# Patient Record
Sex: MALE
[De-identification: names, ages, dates, MRNs, and addresses within clinical notes are randomized per-mention and may not be internally consistent; named-entity substitution may affect disease eponyms.]

---

## 2024-09-10 PROBLEM — Z00.00 ENCOUNTER FOR PREVENTIVE HEALTH EXAMINATION: Status: ACTIVE | Noted: 2024-09-10

## 2024-09-11 ENCOUNTER — APPOINTMENT (OUTPATIENT)
Dept: NEUROLOGY | Facility: CLINIC | Age: 69
End: 2024-09-11
Payer: MEDICARE

## 2024-09-11 VITALS
BODY MASS INDEX: 19.18 KG/M2 | DIASTOLIC BLOOD PRESSURE: 84 MMHG | OXYGEN SATURATION: 97 % | WEIGHT: 137 LBS | HEART RATE: 85 BPM | HEIGHT: 71 IN | SYSTOLIC BLOOD PRESSURE: 149 MMHG

## 2024-09-11 DIAGNOSIS — F02.80 OTHER FRONTOTEMPORAL DEMENTIA: ICD-10-CM

## 2024-09-11 DIAGNOSIS — E78.2 MIXED HYPERLIPIDEMIA: ICD-10-CM

## 2024-09-11 DIAGNOSIS — R41.3 OTHER AMNESIA: ICD-10-CM

## 2024-09-11 DIAGNOSIS — F07.0 PERSONALITY CHANGE DUE TO KNOWN PHYSIOLOGICAL CONDITION: ICD-10-CM

## 2024-09-11 DIAGNOSIS — C43.9 MALIGNANT MELANOMA OF SKIN, UNSPECIFIED: ICD-10-CM

## 2024-09-11 DIAGNOSIS — C61 MALIGNANT NEOPLASM OF PROSTATE: ICD-10-CM

## 2024-09-11 DIAGNOSIS — Z86.79 PERSONAL HISTORY OF OTHER DISEASES OF THE CIRCULATORY SYSTEM: ICD-10-CM

## 2024-09-11 DIAGNOSIS — K21.9 GASTRO-ESOPHAGEAL REFLUX DISEASE W/OUT ESOPHAGITIS: ICD-10-CM

## 2024-09-11 DIAGNOSIS — F43.29 ADJUSTMENT DISORDER WITH OTHER SYMPTOMS: ICD-10-CM

## 2024-09-11 DIAGNOSIS — G31.09 OTHER FRONTOTEMPORAL DEMENTIA: ICD-10-CM

## 2024-09-11 DIAGNOSIS — F32.A DEPRESSION, UNSPECIFIED: ICD-10-CM

## 2024-09-11 DIAGNOSIS — I63.9 CEREBRAL INFARCTION, UNSPECIFIED: ICD-10-CM

## 2024-09-11 DIAGNOSIS — N40.0 BENIGN PROSTATIC HYPERPLASIA WITHOUT LOWER URINARY TRACT SYMPMS: ICD-10-CM

## 2024-09-11 PROCEDURE — 99205 OFFICE O/P NEW HI 60 MIN: CPT

## 2024-09-11 RX ORDER — ATORVASTATIN CALCIUM 40 MG/1
40 TABLET, FILM COATED ORAL
Refills: 0 | Status: ACTIVE | COMMUNITY

## 2024-09-11 RX ORDER — TAMSULOSIN HYDROCHLORIDE 0.4 MG/1
0.4 CAPSULE ORAL
Refills: 0 | Status: ACTIVE | COMMUNITY

## 2024-09-11 RX ORDER — DEUCRAVACITINIB 6 MG/1
6 TABLET, FILM COATED ORAL
Refills: 0 | Status: ACTIVE | COMMUNITY

## 2024-09-11 RX ORDER — ESCITALOPRAM OXALATE 20 MG/1
20 TABLET ORAL
Refills: 0 | Status: ACTIVE | COMMUNITY

## 2024-09-11 RX ORDER — DONEPEZIL HYDROCHLORIDE 5 MG/1
5 TABLET ORAL
Qty: 60 | Refills: 5 | Status: ACTIVE | COMMUNITY
Start: 2024-09-11 | End: 1900-01-01

## 2024-09-11 RX ORDER — ASPIRIN 81 MG
81 TABLET, DELAYED RELEASE (ENTERIC COATED) ORAL
Refills: 0 | Status: ACTIVE | COMMUNITY

## 2024-09-11 RX ORDER — MIRABEGRON 8 MG/8ML
GRANULE, FOR SUSPENSION, EXTENDED RELEASE ORAL
Refills: 0 | Status: ACTIVE | COMMUNITY

## 2024-09-11 NOTE — ASSESSMENT
[FreeTextEntry1] : Bar Day is a 70YO male, presenting to the office today to establish care for memory loss related to history of stroke.  Patient's main complaint is short-term memory loss with symptoms of dementia including getting lost wandering, forgetting to eat or bathe and social isolation.  Patient does also exhibit afternoon fatigue which is common after stroke.  Discussed all of these findings with the patient and his family who are present today and discussed that given the location of the strokes within the brain that this is very typical findings.  We discussed that given that they were triggered by stroke there is no way to say if these will progress or remain the same however given the atrophy of the brain is more advanced than expected for his age would suggest that is safe to assume that these memory issues will continue to progress further.  Discussed with the family that memory issues are often progressive and that there is no current cure or significant treatment available for dementia.  Reassured the family that they are doing the right thing by having him in assisted living where he can have daytime interaction and socialization as well as be monitored to take his medications regularly and on time as well as eat and shower regularly.  Will start donepezil 5 mg once daily to help slow any progression of memory loss.  Side effects discussed with the patient and family and should the patient tolerate the medication recommend increasing to 2 tablets or 10 mg at bedtime in 2 weeks.  Patient encouraged to keep social, continue to do brain tweezers and puzzles, crossword's, reading and to maintain his hobbies as this will help maintain cognitive function.  Also recommending that the patient start PT as planned for gait and balance as patient appears to have some muscle atrophy likely contributing to unsteady gait.  Discussed that patient's history of prostate cancer could be a contributing factor to his hypercoagulable state and recommend following up with urologist as planned to monitor this.  -Follow-up in 6 months or sooner should the need arise.

## 2024-09-11 NOTE — HISTORY OF PRESENT ILLNESS
[FreeTextEntry1] : Bar Day is a 68YO male, presenting to the office today to establish care. Patient was diagnosed with an old stroke last year, now suffers from short term memory loss.  Patient presents with his cousin and healthcare proxy Petra today and her .  Petra and her  report that the patient was living in [facility] with his wife up until 2020 when he suddenly lost his wife during the pandemic.  Patient was then living alone and began to display concerning behavior such as getting lost while driving, wondering or exhibiting forgetful findings.  Healthcare proxy and family decided it was no longer safe for the patient to be living alone and far away from other family members.  Patient was moved down to [place], [place] and is now established living in [place] assisted [facility] for the last few months.  Before the patient had moved to [place] family had taken him to see a neurologist [facility] in June who diagnosed but the patient had a chronic infarct on MRI significant for past stroke.  It is unclear when the patient might of suffered the stroke.  Given the location of stroke in the right frontal lobe and right basal ganglia, memory changes are likely stroke related.  Most recent MRI of the brain also showed advanced atrophy of the brain which could also suggest memory issues from that alone. Patient is currently on ASA 81mg daily, and atorvastatin 40mg daily for stroke prevention.  Patient reports good blood pressure control.  Patient had cardiac workup at time that stroke was found on MRI.  His cousin reports echo and Holter monitor were negative for A-fib or PFO.  Patient strokes are from an unknown cause at this point, patient reports he was a former smoker smoking for about 4 years while in college but then quitting since.  Family also reports that the patient has lost about 15 pounds in the last year before coming to the assisted living, he was forgetting to eat.  Other clinical findings that may be related to stroke include bladder incontinence and urgency.  Patient is currently wearing adult diapers, with known history of prostate CA not currently being treated, currently being monitored.  Patient has follow-up appointment with a new urologist tomorrow.

## 2024-09-11 NOTE — PHYSICAL EXAM
[FreeTextEntry1] : GENERAL PHYSICAL EXAM: GEN: no distress, normal affect EYES: sclera white, conjunctiva clear, no nystagmus SKIN: warm, dry, no rash or lesion on exposed skin   NEUROLOGICAL EXAM: Mental Status Orientation: alert and oriented to person, time, and situation, disoriented to place Language: clear and fluent, intact comprehension and repetition   Cranial Nerves II: visual fields full to confrontation III, IV, VI: PERRL, EOMI V, VII: facial sensation and movement intact and symmetric VIII: hearing intact IX, X: uvula midline, soft palate elevates normally XI: BL shoulder shrug intact XII: tongue midline   Motor Bilateral muscle strength 5/5 in UE and LE, proximally and distally Tone and bulk are normal in upper and lower limbs   Sensation Intact to light touch in all 4 EXTs   Reflex 2+ in BL biceps, brachioradialis, patella   Coordination Normal FTN bilaterally   Gait Normal stance, stride, and pivot turn Gait mildly unsteady Negative Romberg.

## 2024-09-11 NOTE — DATA REVIEWED
[de-identified] : 6/7/24: Impression shows old right basal ganglia and right frontal white matter infarct. No acute intracranial abnormality. Chronic small vessel ischemic disease is likely Advanced cerebral volume loss. [de-identified] : CT Head, CTA Head and Neck 7/3/23: Chronic infarct at the right frontal lobe white matter and smaller chronic lacunar infarct of the left frontal lobe white matter.  Mild involutional change and stigmata of chronic small vessel ischemic disease. Right ICA negative for stenosis left ICA negative for stenosis. Normal variants shown on CTA of head.  Negative for intracranial large vessel occlusion or hemodynamic significant stenosis.

## 2024-09-15 ENCOUNTER — NON-APPOINTMENT (OUTPATIENT)
Age: 69
End: 2024-09-15

## 2024-10-18 ENCOUNTER — NON-APPOINTMENT (OUTPATIENT)
Age: 69
End: 2024-10-18

## 2025-01-09 ENCOUNTER — APPOINTMENT (OUTPATIENT)
Dept: NEUROLOGY | Facility: CLINIC | Age: 70
End: 2025-01-09

## 2025-01-09 VITALS
DIASTOLIC BLOOD PRESSURE: 78 MMHG | HEART RATE: 71 BPM | HEIGHT: 71 IN | BODY MASS INDEX: 21.42 KG/M2 | OXYGEN SATURATION: 97 % | TEMPERATURE: 97.5 F | WEIGHT: 153 LBS | SYSTOLIC BLOOD PRESSURE: 160 MMHG

## 2025-01-09 DIAGNOSIS — G31.84 MILD COGNITIVE IMPAIRMENT, SO STATED: ICD-10-CM

## 2025-01-09 PROCEDURE — 99204 OFFICE O/P NEW MOD 45 MIN: CPT

## 2025-01-10 ENCOUNTER — TRANSCRIPTION ENCOUNTER (OUTPATIENT)
Age: 70
End: 2025-01-10

## 2025-01-29 ENCOUNTER — APPOINTMENT (OUTPATIENT)
Dept: NUCLEAR MEDICINE | Facility: CLINIC | Age: 70
End: 2025-01-29

## 2025-01-29 ENCOUNTER — OUTPATIENT (OUTPATIENT)
Dept: OUTPATIENT SERVICES | Facility: HOSPITAL | Age: 70
LOS: 1 days | End: 2025-01-29

## 2025-01-29 DIAGNOSIS — G31.84 MILD COGNITIVE IMPAIRMENT OF UNCERTAIN OR UNKNOWN ETIOLOGY: ICD-10-CM

## 2025-01-29 PROCEDURE — 78608 BRAIN IMAGING (PET): CPT | Mod: 26

## 2025-02-12 ENCOUNTER — APPOINTMENT (OUTPATIENT)
Dept: NEUROLOGY | Facility: CLINIC | Age: 70
End: 2025-02-12
Payer: MEDICARE

## 2025-02-12 VITALS
HEART RATE: 60 BPM | BODY MASS INDEX: 21 KG/M2 | WEIGHT: 150 LBS | OXYGEN SATURATION: 98 % | DIASTOLIC BLOOD PRESSURE: 81 MMHG | SYSTOLIC BLOOD PRESSURE: 164 MMHG | HEIGHT: 71 IN

## 2025-02-12 DIAGNOSIS — F02.80 OTHER FRONTOTEMPORAL DEMENTIA: ICD-10-CM

## 2025-02-12 DIAGNOSIS — F01.50 VASCULAR DEMENTIA W/OUT BEHAVIORAL DISTURBANCE: ICD-10-CM

## 2025-02-12 DIAGNOSIS — I10 ESSENTIAL (PRIMARY) HYPERTENSION: ICD-10-CM

## 2025-02-12 DIAGNOSIS — G31.09 OTHER FRONTOTEMPORAL DEMENTIA: ICD-10-CM

## 2025-02-12 DIAGNOSIS — G31.84 MILD COGNITIVE IMPAIRMENT, SO STATED: ICD-10-CM

## 2025-02-12 PROCEDURE — 99215 OFFICE O/P EST HI 40 MIN: CPT

## 2025-02-12 RX ORDER — AMLODIPINE BESYLATE 5 MG/1
5 TABLET ORAL DAILY
Qty: 30 | Refills: 3 | Status: ACTIVE | COMMUNITY
Start: 2025-02-12 | End: 1900-01-01

## 2025-02-23 ENCOUNTER — NON-APPOINTMENT (OUTPATIENT)
Age: 70
End: 2025-02-23

## 2025-03-11 ENCOUNTER — NON-APPOINTMENT (OUTPATIENT)
Age: 70
End: 2025-03-11

## 2025-03-12 ENCOUNTER — APPOINTMENT (OUTPATIENT)
Dept: NEUROLOGY | Facility: CLINIC | Age: 70
End: 2025-03-12

## 2025-05-10 ENCOUNTER — NON-APPOINTMENT (OUTPATIENT)
Age: 70
End: 2025-05-10

## 2025-05-12 ENCOUNTER — APPOINTMENT (OUTPATIENT)
Dept: NEUROLOGY | Facility: CLINIC | Age: 70
End: 2025-05-12
Payer: MEDICARE

## 2025-05-12 VITALS
DIASTOLIC BLOOD PRESSURE: 73 MMHG | WEIGHT: 138 LBS | OXYGEN SATURATION: 97 % | HEART RATE: 68 BPM | BODY MASS INDEX: 19.32 KG/M2 | SYSTOLIC BLOOD PRESSURE: 125 MMHG | HEIGHT: 71 IN

## 2025-05-12 DIAGNOSIS — F32.A DEPRESSION, UNSPECIFIED: ICD-10-CM

## 2025-05-12 DIAGNOSIS — F02.80 OTHER FRONTOTEMPORAL DEMENTIA: ICD-10-CM

## 2025-05-12 DIAGNOSIS — I63.9 CEREBRAL INFARCTION, UNSPECIFIED: ICD-10-CM

## 2025-05-12 DIAGNOSIS — I10 ESSENTIAL (PRIMARY) HYPERTENSION: ICD-10-CM

## 2025-05-12 DIAGNOSIS — G31.09 OTHER FRONTOTEMPORAL DEMENTIA: ICD-10-CM

## 2025-05-12 DIAGNOSIS — F01.50 VASCULAR DEMENTIA W/OUT BEHAVIORAL DISTURBANCE: ICD-10-CM

## 2025-05-12 PROCEDURE — 99215 OFFICE O/P EST HI 40 MIN: CPT

## 2025-05-12 PROCEDURE — G2211 COMPLEX E/M VISIT ADD ON: CPT

## 2025-06-11 ENCOUNTER — APPOINTMENT (OUTPATIENT)
Dept: NEUROLOGY | Facility: CLINIC | Age: 70
End: 2025-06-11

## 2025-09-08 ENCOUNTER — APPOINTMENT (OUTPATIENT)
Dept: NEUROLOGY | Facility: CLINIC | Age: 70
End: 2025-09-08
Payer: MEDICARE

## 2025-09-08 VITALS
BODY MASS INDEX: 23.94 KG/M2 | WEIGHT: 171 LBS | HEIGHT: 71 IN | DIASTOLIC BLOOD PRESSURE: 76 MMHG | SYSTOLIC BLOOD PRESSURE: 134 MMHG | OXYGEN SATURATION: 96 % | HEART RATE: 89 BPM

## 2025-09-08 DIAGNOSIS — I63.9 CEREBRAL INFARCTION, UNSPECIFIED: ICD-10-CM

## 2025-09-08 DIAGNOSIS — F02.80 OTHER FRONTOTEMPORAL DEMENTIA: ICD-10-CM

## 2025-09-08 DIAGNOSIS — F01.50 VASCULAR DEMENTIA W/OUT BEHAVIORAL DISTURBANCE: ICD-10-CM

## 2025-09-08 DIAGNOSIS — G31.09 OTHER FRONTOTEMPORAL DEMENTIA: ICD-10-CM

## 2025-09-08 PROCEDURE — G2211 COMPLEX E/M VISIT ADD ON: CPT

## 2025-09-08 PROCEDURE — 99214 OFFICE O/P EST MOD 30 MIN: CPT
